# Patient Record
Sex: MALE | Race: WHITE | NOT HISPANIC OR LATINO | Employment: UNEMPLOYED | URBAN - METROPOLITAN AREA
[De-identification: names, ages, dates, MRNs, and addresses within clinical notes are randomized per-mention and may not be internally consistent; named-entity substitution may affect disease eponyms.]

---

## 2023-09-06 ENCOUNTER — HOSPITAL ENCOUNTER (OUTPATIENT)
Dept: DATA CONVERSION | Facility: HOSPITAL | Age: 53
End: 2023-09-06
Attending: INTERNAL MEDICINE | Admitting: INTERNAL MEDICINE
Payer: COMMERCIAL

## 2023-09-06 DIAGNOSIS — E11.9 TYPE 2 DIABETES MELLITUS WITHOUT COMPLICATIONS (MULTI): ICD-10-CM

## 2023-09-06 DIAGNOSIS — I10 ESSENTIAL (PRIMARY) HYPERTENSION: ICD-10-CM

## 2023-09-06 DIAGNOSIS — I20.9 ANGINA PECTORIS, UNSPECIFIED (CMS-HCC): ICD-10-CM

## 2023-09-06 DIAGNOSIS — I25.119 ATHEROSCLEROTIC HEART DISEASE OF NATIVE CORONARY ARTERY WITH UNSPECIFIED ANGINA PECTORIS (CMS-HCC): ICD-10-CM

## 2023-09-06 DIAGNOSIS — R06.02 SHORTNESS OF BREATH: ICD-10-CM

## 2023-09-06 DIAGNOSIS — E78.5 HYPERLIPIDEMIA, UNSPECIFIED: ICD-10-CM

## 2023-09-29 VITALS — HEIGHT: 69 IN | BODY MASS INDEX: 36.8 KG/M2 | WEIGHT: 248.46 LBS

## 2023-09-30 NOTE — H&P
History & Physical Reviewed:   I have reviewed the History and Physical dated:  10-Aug-2023   History and Physical reviewed and relevant findings noted. Patient examined to review pertinent physical  findings.: No significant changes   Home Medications Reviewed: no changes noted   Allergies Reviewed: no changes noted       Airway/Sedation Assessment:  ·  Emotional Status calm   ·  Neurologic alert & oriented x 3   ·  Respiratory clear to auscultation   ·  Cardiovascular rhythm & rate regular   ·  GI/ soft, nontender     · Pulses present: Pedal Left, Pedal Right, Radial Left, Radial Right     ·  Mouth Opening OK yes   ·  Neck Flexibility OK yes   ·  Loose Teeth no     Oropharyngeal Classification:          ·  Oropharyngeal Classification Class I   ·  ASA PS Classification ASA II   ·  Sedation Plan moderate sedation       ERAS (Enhanced Recovery After Surgery):  ·  ERAS Patient: no     Consent:   COVID-19 Consent:  ·  COVID-19 Risk Consent Surgeon has reviewed key risks related to the risk of herminio COVID-19 and if they contract COVID-19 what the risks are.       Electronic Signatures:  Harpreet Rhodes)  (Signed 06-Sep-2023 07:47)   Authored: History & Physical Reviewed, Airway/Sedation,  ERAS, Consent, Note Completion      Last Updated: 06-Sep-2023 07:47 by Harpreet Rhodes)

## 2023-12-21 ENCOUNTER — HOSPITAL ENCOUNTER (EMERGENCY)
Facility: HOSPITAL | Age: 53
Discharge: HOME | End: 2023-12-21
Attending: EMERGENCY MEDICINE
Payer: MEDICARE

## 2023-12-21 ENCOUNTER — APPOINTMENT (OUTPATIENT)
Dept: RADIOLOGY | Facility: HOSPITAL | Age: 53
End: 2023-12-21
Payer: MEDICARE

## 2023-12-21 VITALS
OXYGEN SATURATION: 96 % | SYSTOLIC BLOOD PRESSURE: 150 MMHG | RESPIRATION RATE: 18 BRPM | DIASTOLIC BLOOD PRESSURE: 92 MMHG | HEIGHT: 69 IN | WEIGHT: 248 LBS | HEART RATE: 89 BPM | BODY MASS INDEX: 36.73 KG/M2 | TEMPERATURE: 98.2 F

## 2023-12-21 DIAGNOSIS — G43.819 OTHER MIGRAINE WITHOUT STATUS MIGRAINOSUS, INTRACTABLE: Primary | ICD-10-CM

## 2023-12-21 LAB
ALBUMIN SERPL BCP-MCNC: 5 G/DL (ref 3.4–5)
ALP SERPL-CCNC: 62 U/L (ref 33–120)
ALT SERPL W P-5'-P-CCNC: 19 U/L (ref 10–52)
ANION GAP SERPL CALC-SCNC: 12 MMOL/L (ref 10–20)
AST SERPL W P-5'-P-CCNC: 15 U/L (ref 9–39)
BASOPHILS # BLD AUTO: 0.04 X10*3/UL (ref 0–0.1)
BASOPHILS NFR BLD AUTO: 0.5 %
BILIRUB SERPL-MCNC: 0.4 MG/DL (ref 0–1.2)
BUN SERPL-MCNC: 14 MG/DL (ref 6–23)
CALCIUM SERPL-MCNC: 10.4 MG/DL (ref 8.6–10.3)
CHLORIDE SERPL-SCNC: 102 MMOL/L (ref 98–107)
CO2 SERPL-SCNC: 28 MMOL/L (ref 21–32)
CREAT SERPL-MCNC: 0.92 MG/DL (ref 0.5–1.3)
CRP SERPL-MCNC: 0.15 MG/DL
EOSINOPHIL # BLD AUTO: 0.1 X10*3/UL (ref 0–0.7)
EOSINOPHIL NFR BLD AUTO: 1.3 %
ERYTHROCYTE [DISTWIDTH] IN BLOOD BY AUTOMATED COUNT: 13.1 % (ref 11.5–14.5)
ERYTHROCYTE [SEDIMENTATION RATE] IN BLOOD BY WESTERGREN METHOD: 8 MM/H (ref 0–20)
GFR SERPL CREATININE-BSD FRML MDRD: >90 ML/MIN/1.73M*2
GLUCOSE SERPL-MCNC: 184 MG/DL (ref 74–99)
HCT VFR BLD AUTO: 50.3 % (ref 41–52)
HGB BLD-MCNC: 17.1 G/DL (ref 13.5–17.5)
HOLD SPECIMEN: NORMAL
IMM GRANULOCYTES # BLD AUTO: 0.02 X10*3/UL (ref 0–0.7)
IMM GRANULOCYTES NFR BLD AUTO: 0.3 % (ref 0–0.9)
LYMPHOCYTES # BLD AUTO: 2.25 X10*3/UL (ref 1.2–4.8)
LYMPHOCYTES NFR BLD AUTO: 29.3 %
MCH RBC QN AUTO: 29.5 PG (ref 26–34)
MCHC RBC AUTO-ENTMCNC: 34 G/DL (ref 32–36)
MCV RBC AUTO: 87 FL (ref 80–100)
MONOCYTES # BLD AUTO: 0.35 X10*3/UL (ref 0.1–1)
MONOCYTES NFR BLD AUTO: 4.6 %
NEUTROPHILS # BLD AUTO: 4.92 X10*3/UL (ref 1.2–7.7)
NEUTROPHILS NFR BLD AUTO: 64 %
NRBC BLD-RTO: 0 /100 WBCS (ref 0–0)
PLATELET # BLD AUTO: 286 X10*3/UL (ref 150–450)
POTASSIUM SERPL-SCNC: 4.4 MMOL/L (ref 3.5–5.3)
PROT SERPL-MCNC: 7.3 G/DL (ref 6.4–8.2)
RBC # BLD AUTO: 5.8 X10*6/UL (ref 4.5–5.9)
SODIUM SERPL-SCNC: 138 MMOL/L (ref 136–145)
WBC # BLD AUTO: 7.7 X10*3/UL (ref 4.4–11.3)

## 2023-12-21 PROCEDURE — 85025 COMPLETE CBC W/AUTO DIFF WBC: CPT | Performed by: EMERGENCY MEDICINE

## 2023-12-21 PROCEDURE — 96374 THER/PROPH/DIAG INJ IV PUSH: CPT

## 2023-12-21 PROCEDURE — 86140 C-REACTIVE PROTEIN: CPT | Performed by: EMERGENCY MEDICINE

## 2023-12-21 PROCEDURE — 85652 RBC SED RATE AUTOMATED: CPT | Performed by: EMERGENCY MEDICINE

## 2023-12-21 PROCEDURE — 80053 COMPREHEN METABOLIC PANEL: CPT | Performed by: EMERGENCY MEDICINE

## 2023-12-21 PROCEDURE — 70450 CT HEAD/BRAIN W/O DYE: CPT

## 2023-12-21 PROCEDURE — 2500000004 HC RX 250 GENERAL PHARMACY W/ HCPCS (ALT 636 FOR OP/ED)

## 2023-12-21 PROCEDURE — 36415 COLL VENOUS BLD VENIPUNCTURE: CPT | Performed by: EMERGENCY MEDICINE

## 2023-12-21 PROCEDURE — 96372 THER/PROPH/DIAG INJ SC/IM: CPT | Mod: 59

## 2023-12-21 PROCEDURE — 70450 CT HEAD/BRAIN W/O DYE: CPT | Performed by: RADIOLOGY

## 2023-12-21 PROCEDURE — 99284 EMERGENCY DEPT VISIT MOD MDM: CPT | Mod: 25 | Performed by: EMERGENCY MEDICINE

## 2023-12-21 RX ORDER — SERTRALINE HYDROCHLORIDE 150 MG/1
CAPSULE ORAL
COMMUNITY

## 2023-12-21 RX ORDER — METOPROLOL SUCCINATE 50 MG/1
1 TABLET, EXTENDED RELEASE ORAL EVERY 24 HOURS
COMMUNITY

## 2023-12-21 RX ORDER — FAMOTIDINE 20 MG/1
20 TABLET, FILM COATED ORAL 2 TIMES DAILY
COMMUNITY
Start: 2014-05-02

## 2023-12-21 RX ORDER — ROSUVASTATIN CALCIUM 40 MG/1
20 TABLET, COATED ORAL
COMMUNITY
Start: 2023-11-15

## 2023-12-21 RX ORDER — FENOFIBRATE 145 MG/1
145 TABLET, FILM COATED ORAL
COMMUNITY
Start: 2023-07-20

## 2023-12-21 RX ORDER — DIPHENHYDRAMINE HYDROCHLORIDE 50 MG/ML
25 INJECTION INTRAMUSCULAR; INTRAVENOUS ONCE
Status: COMPLETED | OUTPATIENT
Start: 2023-12-21 | End: 2023-12-21

## 2023-12-21 RX ORDER — METFORMIN HYDROCHLORIDE 500 MG/1
500 TABLET ORAL
COMMUNITY

## 2023-12-21 RX ORDER — KETOROLAC TROMETHAMINE 30 MG/ML
60 INJECTION, SOLUTION INTRAMUSCULAR; INTRAVENOUS ONCE
Status: DISCONTINUED | OUTPATIENT
Start: 2023-12-21 | End: 2023-12-21 | Stop reason: HOSPADM

## 2023-12-21 RX ORDER — INSULIN GLARGINE 100 [IU]/ML
INJECTION, SOLUTION SUBCUTANEOUS
COMMUNITY

## 2023-12-21 RX ORDER — LOSARTAN POTASSIUM 25 MG/1
25 TABLET ORAL
COMMUNITY

## 2023-12-21 RX ORDER — DIPHENHYDRAMINE HYDROCHLORIDE 50 MG/ML
INJECTION INTRAMUSCULAR; INTRAVENOUS
Status: COMPLETED
Start: 2023-12-21 | End: 2023-12-21

## 2023-12-21 RX ORDER — ASPIRIN 81 MG/1
81 TABLET ORAL DAILY
COMMUNITY
Start: 2023-07-20

## 2023-12-21 RX ORDER — FLUOXETINE HYDROCHLORIDE 20 MG/1
60 CAPSULE ORAL
COMMUNITY

## 2023-12-21 RX ORDER — KETOROLAC TROMETHAMINE 30 MG/ML
INJECTION, SOLUTION INTRAMUSCULAR; INTRAVENOUS
Status: COMPLETED
Start: 2023-12-21 | End: 2023-12-21

## 2023-12-21 RX ADMIN — DIPHENHYDRAMINE HYDROCHLORIDE 25 MG: 50 INJECTION INTRAMUSCULAR; INTRAVENOUS at 10:43

## 2023-12-21 RX ADMIN — KETOROLAC TROMETHAMINE 60 MG: 30 INJECTION, SOLUTION INTRAMUSCULAR; INTRAVENOUS at 10:44

## 2023-12-21 ASSESSMENT — PAIN SCALES - GENERAL: PAINLEVEL_OUTOF10: 6

## 2023-12-21 ASSESSMENT — PAIN DESCRIPTION - FREQUENCY: FREQUENCY: CONSTANT/CONTINUOUS

## 2023-12-21 ASSESSMENT — PAIN DESCRIPTION - DESCRIPTORS: DESCRIPTORS: ACHING

## 2023-12-21 ASSESSMENT — PAIN - FUNCTIONAL ASSESSMENT: PAIN_FUNCTIONAL_ASSESSMENT: 0-10

## 2023-12-21 ASSESSMENT — COLUMBIA-SUICIDE SEVERITY RATING SCALE - C-SSRS
6. HAVE YOU EVER DONE ANYTHING, STARTED TO DO ANYTHING, OR PREPARED TO DO ANYTHING TO END YOUR LIFE?: NO
2. HAVE YOU ACTUALLY HAD ANY THOUGHTS OF KILLING YOURSELF?: NO
1. IN THE PAST MONTH, HAVE YOU WISHED YOU WERE DEAD OR WISHED YOU COULD GO TO SLEEP AND NOT WAKE UP?: NO

## 2023-12-21 ASSESSMENT — PAIN DESCRIPTION - PAIN TYPE: TYPE: ACUTE PAIN

## 2023-12-21 NOTE — ED PROVIDER NOTES
HPI   Chief Complaint   Patient presents with    Diplopia     Double vision and pain left eye to temple and down left side of neck since Monday. Was seen at  in [place] and sent here       Patient presents from a local urgent care with a chief complaint of right eye pain and vision changes for approximately the past 4 days.  He was evaluated at urgent care and sent into the emergency department for evaluation of temporal arteritis.  The patient states that he does have a history of migraines and this is similar to one of his previous migraines in the past.  No nausea or vomiting.  Double vision at times however.    ROS:    CONSTITUTIONAL: Denies weight loss, fever and chills    HEENT: Left eye pain with some vision changes    RESPIRATORY: Denies SOB and cough    CV: Denies palpitations or chest pain    GI: Denies abdominal pain, nausea, vomiting and diarrhea    : Denies dysuria and urinary frequency    MUSCULOSKELETAL: Denies myalgia and joint pain    SKIN: Denies rash and pruritus    NEUROLOGICAL: Denies headache and syncope    PSYCHIATRIC: Denies recent changes in mood. Denies anxiety and depression      PHYSICAL EXAM:    GENERAL: Alert and oriented x 3. No acute distress. Well-nourished    EYES: EOMI. Anicteric    HEENT: Moist mucous membranes. No scleral icterus. No cervical lymphadenopathy    LUNGS: Clear to auscultation bilaterally. No accessory muscle use    CARDIOVASCULAR: Regular rate and rhythm. No murmur. No JVD    ABDOMEN: Soft, non-tender and non-distended. No palpable masses    EXTREMITIES: No edema. Non-tender    SKIN: No rashes or lesions    NEUROLOGIC: No focal neurological deficits. CN II-XII grossly intact    PSYCHIATRIC: Cooperative. Appropriate mood and affect      Medical Decision Making:    Differential Diagnosis: Temporal arteritis, migraine, hypertension    Clinical Laboratory Review: Reviewed all labs within normal limits, no signs of temporal arteritis    Imaging Review: CT head  negative    Discussion with Consulting Physician:    Treatment Plan: Discussed with patient this most likely is a migraine.  His blood pressure is elevated which is not helping with his headache and eye pain.  Patient will be treated with an injection of Toradol and Benadryl at this time.  [facility] but he states that they canceled it.  He has been educated to call [facility] and see if he can get in sooner due to his eye pain or migraine.  Patient verbalizes understanding    Follow Up:  Follow up with your primary care physician as soon as possible    Return Precautions:  Return to the emergency department if symptoms worsen at any time    Diagnoses as of 12/21/23 1046  Other migraine without status migrainosus, intractable                                 [place] Coma Scale Score: 15                  Patient History   Past Medical History:   Diagnosis Date    Other specified symptoms and signs involving the circulatory and respiratory systems 10/31/2018    Globus sensation     Past Surgical History:   Procedure Laterality Date    GALLBLADDER SURGERY  10/12/2018    Gallbladder Surgery     No family history on file.  Social History     Tobacco Use    Smoking status: Never    Smokeless tobacco: Never   Substance Use Topics    Alcohol use: Not on file    Drug use: Not on file       Physical Exam   ED Triage Vitals [12/21/23 0949]   Temp Heart Rate Resp BP   36.8 °C (98.2 °F) 95 18 (!) 177/107      SpO2 Temp src Heart Rate Source Patient Position   97 % -- -- --      BP Location FiO2 (%)     -- --       Physical Exam    ED Course & MDM   Diagnoses as of 12/21/23 1046   Other migraine without status migrainosus, intractable       Medical Decision Making      Procedure  Procedures     Ana Soria, DO  12/21/23 1043       Ana Soria, DO  12/21/23 1046

## 2024-02-19 ENCOUNTER — HOSPITAL ENCOUNTER (EMERGENCY)
Facility: HOSPITAL | Age: 54
Discharge: HOME | End: 2024-02-19
Attending: STUDENT IN AN ORGANIZED HEALTH CARE EDUCATION/TRAINING PROGRAM
Payer: MEDICARE

## 2024-02-19 ENCOUNTER — APPOINTMENT (OUTPATIENT)
Dept: RADIOLOGY | Facility: HOSPITAL | Age: 54
End: 2024-02-19
Payer: MEDICARE

## 2024-02-19 VITALS
DIASTOLIC BLOOD PRESSURE: 89 MMHG | OXYGEN SATURATION: 97 % | WEIGHT: 260.36 LBS | HEIGHT: 69 IN | RESPIRATION RATE: 16 BRPM | SYSTOLIC BLOOD PRESSURE: 162 MMHG | HEART RATE: 95 BPM | TEMPERATURE: 98.4 F | BODY MASS INDEX: 38.56 KG/M2
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
## 2024-02-19 DIAGNOSIS — S20.219A CONTUSION OF CHEST WALL, UNSPECIFIED LATERALITY, INITIAL ENCOUNTER: Primary | ICD-10-CM

## 2024-02-19 PROCEDURE — 71046 X-RAY EXAM CHEST 2 VIEWS: CPT

## 2024-02-19 PROCEDURE — 2500000004 HC RX 250 GENERAL PHARMACY W/ HCPCS (ALT 636 FOR OP/ED): Performed by: STUDENT IN AN ORGANIZED HEALTH CARE EDUCATION/TRAINING PROGRAM

## 2024-02-19 PROCEDURE — 99283 EMERGENCY DEPT VISIT LOW MDM: CPT

## 2024-02-19 PROCEDURE — 71046 X-RAY EXAM CHEST 2 VIEWS: CPT | Mod: FOREIGN READ | Performed by: RADIOLOGY

## 2024-02-19 PROCEDURE — 96372 THER/PROPH/DIAG INJ SC/IM: CPT

## 2024-02-19 RX ORDER — KETOROLAC TROMETHAMINE 15 MG/ML
15 INJECTION, SOLUTION INTRAMUSCULAR; INTRAVENOUS ONCE
Status: COMPLETED | OUTPATIENT
Start: 2024-02-19 | End: 2024-02-19

## 2024-02-19 RX ADMIN — KETOROLAC TROMETHAMINE 15 MG: 15 INJECTION, SOLUTION INTRAMUSCULAR; INTRAVENOUS at 09:02

## 2024-02-19 ASSESSMENT — PAIN - FUNCTIONAL ASSESSMENT
PAIN_FUNCTIONAL_ASSESSMENT: 0-10
PAIN_FUNCTIONAL_ASSESSMENT: 0-10

## 2024-02-19 ASSESSMENT — PAIN DESCRIPTION - LOCATION
LOCATION: OTHER (COMMENT)
LOCATION: STERNUM

## 2024-02-19 ASSESSMENT — PAIN SCALES - GENERAL
PAINLEVEL_OUTOF10: 9
PAINLEVEL_OUTOF10: 8

## 2024-02-19 ASSESSMENT — PAIN DESCRIPTION - PAIN TYPE
TYPE: ACUTE PAIN
TYPE: ACUTE PAIN

## 2024-02-19 ASSESSMENT — COLUMBIA-SUICIDE SEVERITY RATING SCALE - C-SSRS
1. IN THE PAST MONTH, HAVE YOU WISHED YOU WERE DEAD OR WISHED YOU COULD GO TO SLEEP AND NOT WAKE UP?: NO
6. HAVE YOU EVER DONE ANYTHING, STARTED TO DO ANYTHING, OR PREPARED TO DO ANYTHING TO END YOUR LIFE?: NO
2. HAVE YOU ACTUALLY HAD ANY THOUGHTS OF KILLING YOURSELF?: NO

## 2024-02-19 ASSESSMENT — PAIN DESCRIPTION - FREQUENCY: FREQUENCY: CONSTANT/CONTINUOUS

## 2024-02-19 ASSESSMENT — PAIN DESCRIPTION - DESCRIPTORS: DESCRIPTORS: ACHING

## 2024-02-19 ASSESSMENT — PAIN DESCRIPTION - PROGRESSION: CLINICAL_PROGRESSION: NOT CHANGED

## 2024-02-19 NOTE — ED PROVIDER NOTES
Chief Complaint: Fall  HPI: This is a 54-year-old male, presenting to the emergency department for evaluation of anterior chest wall pain which began around 1 AM after he slipped down the last 3 stairs and fell forward onto his chest.  Patient states that he caught the edge of the step on his anterior chest, and is having pain.  He states that he did hit his head however denies any loss consciousness.  He denies any dizziness, lightheadedness, nausea, vomiting, change in vision.  He is not on blood thinners.  He denies any neck or back pain.    Past Medical History:   Diagnosis Date    Other specified symptoms and signs involving the circulatory and respiratory systems 10/31/2018    Globus sensation      Past Surgical History:   Procedure Laterality Date    GALLBLADDER SURGERY  10/12/2018    Gallbladder Surgery       Physical Exam  Constitutional:       Appearance: Normal appearance.   HENT:      Head: Normocephalic and atraumatic.      Mouth/Throat:      Mouth: Mucous membranes are moist.   Eyes:      Extraocular Movements: Extraocular movements intact.   Cardiovascular:      Rate and Rhythm: Normal rate and regular rhythm.   Pulmonary:      Effort: Pulmonary effort is normal.   Chest:      Chest wall: Tenderness (Chest wall tenderness to the anterior and right side of the chest wall with a very small abrasion to the anterior chest) present.   Abdominal:      General: Abdomen is flat.      Palpations: Abdomen is soft.   Skin:     General: Skin is warm.   Neurological:      General: No focal deficit present.      Mental Status: He is alert.   Psychiatric:         Mood and Affect: Mood normal.            ED Course/[facility]  Diagnoses as of 02/19/24 0937   Contusion of chest wall, unspecified laterality, initial encounter   Fall, initial encounter       This is a 54 y.o. male presenting to the ED for evaluation of anterior chest wall pain which began around 1:00 this morning when he slipped on the last few stairs and fell,  striking the front of his chest.  Patient states that he did hit his head however denies any loss of consciousness, and is not on any blood thinners.  He did not take any medication for the pain this morning.  On physical exam, patient is resting comfortably in the bed, no acute distress.  Heart is regular rate and rhythm, lungs are clear to auscultation bilaterally.  There is a very small area of abrasion to the anterior chest with some tenderness to palpation in the area.  No crepitus, deformity, step-offs.  No other external signs of trauma.  No C/T/L-spine tenderness.  Patient was given Toradol with relief in his pain.  Chest x-ray was nonconcerning for any evidence of rib fractures or lung pathology.  I do feel that the patient's symptoms are likely secondary to a rib contusion.  He was given incentive spirometer and advised to use it at home.  He was advised to return to the emergency department for any worsening symptoms and was ultimately discharged home in stable condition.    Final Impression  1. Chest wall contusion   Disposition/Plan: discharge home   Condition at disposition: Stable.     Kirstin Christianson DO  Emergency Medicine Physician     Kirstin Christianson DO  02/19/24 1028

## 2025-03-28 ENCOUNTER — OFFICE VISIT (OUTPATIENT)
Dept: URGENT CARE | Facility: URGENT CARE | Age: 55
End: 2025-03-28
Payer: COMMERCIAL

## 2025-03-28 VITALS
BODY MASS INDEX: 37.37 KG/M2 | OXYGEN SATURATION: 96 % | WEIGHT: 253.09 LBS | SYSTOLIC BLOOD PRESSURE: 126 MMHG | TEMPERATURE: 98.5 F | RESPIRATION RATE: 16 BRPM | HEART RATE: 110 BPM | DIASTOLIC BLOOD PRESSURE: 82 MMHG

## 2025-03-28 DIAGNOSIS — R11.2 NAUSEA AND VOMITING, UNSPECIFIED VOMITING TYPE: ICD-10-CM

## 2025-03-28 DIAGNOSIS — A08.4 VIRAL GASTROENTERITIS: Primary | ICD-10-CM

## 2025-03-28 DIAGNOSIS — R19.7 DIARRHEA OF PRESUMED INFECTIOUS ORIGIN: ICD-10-CM

## 2025-03-28 PROBLEM — E83.110 HEREDITARY HEMOCHROMATOSIS (CMS-HCC): Status: ACTIVE | Noted: 2025-03-28

## 2025-03-28 PROBLEM — R79.89 ABNORMAL LIVER FUNCTION TESTS: Status: ACTIVE | Noted: 2025-03-28

## 2025-03-28 PROBLEM — H52.4 PRESBYOPIA: Status: ACTIVE | Noted: 2025-03-28

## 2025-03-28 PROBLEM — F32.9 MAJOR DEPRESSIVE DISORDER: Status: ACTIVE | Noted: 2025-03-28

## 2025-03-28 PROBLEM — F43.12 CHRONIC POST-TRAUMATIC STRESS DISORDER (PTSD): Status: ACTIVE | Noted: 2025-03-28

## 2025-03-28 PROBLEM — E11.36 TYPE 2 DIABETES MELLITUS WITH DIABETIC CATARACT: Status: ACTIVE | Noted: 2025-03-28

## 2025-03-28 PROBLEM — E34.9 HYPOTESTOSTERONEMIA: Status: ACTIVE | Noted: 2025-03-28

## 2025-03-28 PROBLEM — E11.9 TYPE 2 DIABETES MELLITUS: Status: ACTIVE | Noted: 2025-03-28

## 2025-03-28 PROBLEM — E11.42 DIABETIC PERIPHERAL NEUROPATHY ASSOCIATED WITH TYPE 2 DIABETES MELLITUS: Status: ACTIVE | Noted: 2025-03-28

## 2025-03-28 PROBLEM — I25.10 ATHEROSCLEROSIS OF CORONARY ARTERY: Status: ACTIVE | Noted: 2025-03-28

## 2025-03-28 PROBLEM — G43.909 MIGRAINE HEADACHE: Status: ACTIVE | Noted: 2025-03-28

## 2025-03-28 PROBLEM — R79.89 HIGH SERUM FERRITIN: Status: ACTIVE | Noted: 2025-03-28

## 2025-03-28 PROBLEM — E78.5 HYPERLIPIDEMIA: Status: ACTIVE | Noted: 2025-03-28

## 2025-03-28 PROBLEM — E55.9 VITAMIN D DEFICIENCY: Status: ACTIVE | Noted: 2025-03-28

## 2025-03-28 PROBLEM — I10 BENIGN ESSENTIAL HYPERTENSION: Status: ACTIVE | Noted: 2025-03-28

## 2025-03-28 PROBLEM — K57.32 DIVERTICULITIS OF COLON: Status: ACTIVE | Noted: 2025-03-28

## 2025-03-28 PROBLEM — G47.33 OBSTRUCTIVE SLEEP APNEA OF ADULT: Status: ACTIVE | Noted: 2025-03-28

## 2025-03-28 PROBLEM — I10 HYPERTENSION: Status: ACTIVE | Noted: 2025-03-28

## 2025-03-28 PROBLEM — E66.9 OBESITY: Status: ACTIVE | Noted: 2025-03-28

## 2025-03-28 PROBLEM — Q67.0 FACIAL ASYMMETRY: Status: ACTIVE | Noted: 2025-03-28

## 2025-03-28 PROBLEM — E11.40 DIABETIC NEUROPATHY (MULTI): Status: ACTIVE | Noted: 2025-03-28

## 2025-03-28 PROBLEM — K21.9 GERD (GASTROESOPHAGEAL REFLUX DISEASE): Status: ACTIVE | Noted: 2025-03-28

## 2025-03-28 PROBLEM — E83.42 HYPOMAGNESEMIA: Status: ACTIVE | Noted: 2025-03-28

## 2025-03-28 RX ORDER — ONDANSETRON 4 MG/1
4 TABLET, ORALLY DISINTEGRATING ORAL ONCE
Status: COMPLETED | OUTPATIENT
Start: 2025-03-28 | End: 2025-03-28

## 2025-03-28 RX ORDER — GABAPENTIN 100 MG/1
100 CAPSULE ORAL
COMMUNITY
Start: 2025-03-24

## 2025-03-28 RX ORDER — IBUPROFEN 200 MG
4 TABLET ORAL AS NEEDED
COMMUNITY

## 2025-03-28 RX ORDER — TADALAFIL 10 MG/1
10 TABLET ORAL DAILY PRN
COMMUNITY
Start: 2024-08-08

## 2025-03-28 RX ORDER — ONDANSETRON 4 MG/1
4 TABLET, ORALLY DISINTEGRATING ORAL EVERY 8 HOURS PRN
Qty: 20 TABLET | Refills: 0 | Status: SHIPPED | OUTPATIENT
Start: 2025-03-28 | End: 2025-04-05

## 2025-03-28 RX ADMIN — ONDANSETRON 4 MG: 4 TABLET, ORALLY DISINTEGRATING ORAL at 18:36

## 2025-03-28 ASSESSMENT — ENCOUNTER SYMPTOMS
DYSURIA: 0
SORE THROAT: 0
DIARRHEA: 1
SHORTNESS OF BREATH: 0
SINUS PRESSURE: 0
NAUSEA: 1
RHINORRHEA: 0
VOMITING: 1
FREQUENCY: 0
WHEEZING: 0
CONSTIPATION: 0
PALPITATIONS: 0
HEADACHES: 1
COUGH: 0
CHEST TIGHTNESS: 0
CHILLS: 0
ABDOMINAL PAIN: 1
FEVER: 0

## 2025-03-28 NOTE — PROGRESS NOTES
Subjective   Patient ID: Pato Venegas is a 55 y.o. male.    HPI: 55-year-old male presents with wife with complaint of nausea and vomiting since 3 AM yesterday with diarrhea beginning at 4 AM today.  He also reports a headache.      History provided by:  Patient   used: No    Headache  Associated symptoms: abdominal pain, diarrhea, nausea and vomiting    Associated symptoms: no congestion, no cough, no ear pain, no fever, no sinus pressure and no sore throat    Diarrhea  Associated symptoms: abdominal pain, headaches and vomiting    Associated symptoms: no chills and no fever    Vomiting  Associated symptoms: abdominal pain, diarrhea and headaches    Associated symptoms: no chills, no cough, no fever and no sore throat        The following portions of the chart were reviewed this encounter and updated as appropriate:  Tobacco  Allergies  Meds  Problems  Med Hx  Surg Hx  Fam Hx         Review of Systems   Constitutional:  Negative for chills and fever.   HENT:  Negative for congestion, ear pain, rhinorrhea, sinus pressure and sore throat.    Respiratory:  Negative for cough, chest tightness, shortness of breath and wheezing.    Cardiovascular:  Negative for palpitations.   Gastrointestinal:  Positive for abdominal pain, diarrhea, nausea and vomiting. Negative for constipation.   Genitourinary:  Negative for dysuria and frequency.   Neurological:  Positive for headaches.     Objective   Physical Exam  Vital signs are reviewed.  Tachycardic at 110 bpm.    Alert and oriented x3 with normal mood and affect  Patient is well nourished, well-developed, alert and in no acute distress  No pain to palpation over frontal, ethmoid or maxillary sinus areas    External eyes, orbits, conjunctiva and eyelids are normal in appearance  Pupils are equal, round, reactive to light and accommodation, extraocular movements intact    External ears appear normal  External canals are normal in appearance  Right  tympanic membrane is intact and pale gray in appearance  Left tympanic membrane is intact and pale gray in appearance  There is no middle ear effusion noted on the right  There is no middle ear effusion noted on the left  External appearance of the nose is normal  Nasal mucosa, septum, turbinates are mildly reddened and moderately swollen in appearance  There is scant thin yellow nasal discharge in both nares    Oral mucosa is uniformly pink and moist  Palate is pink, symmetric and intact  Tongue is moist, mobile and midline  Posterior pharynx mildly erythematous with no concretions or exudates present  No cervical lymphadenopathy palpated    Heart has regular rate and rhythm. No murmurs, rubs or gallops are auscultated at this exam.    Respiratory rate rhythm and effort are normal. Breath sounds bilaterally are clear on auscultation without crackles, rhonchi, wheezes or friction rub.    Abdomen: Normal bowel sounds on auscultation. Soft, generalized nontenderness without rebound or rigidity on palpation  Procedures    Assessment/Plan   Diagnoses and all orders for this visit:  Viral gastroenteritis  -     ondansetron ODT (Zofran-ODT) disintegrating tablet 4 mg  -     ondansetron ODT (Zofran-ODT) 4 mg disintegrating tablet; Dissolve 1 tablet (4 mg) in the mouth every 8 hours if needed for nausea or vomiting for up to 7 days.  Nausea and vomiting, unspecified vomiting type  -     ondansetron ODT (Zofran-ODT) disintegrating tablet 4 mg  -     ondansetron ODT (Zofran-ODT) 4 mg disintegrating tablet; Dissolve 1 tablet (4 mg) in the mouth every 8 hours if needed for nausea or vomiting for up to 7 days.  Diarrhea of presumed infectious origin    Patient disposition: Home

## 2025-03-28 NOTE — PATIENT INSTRUCTIONS
You have gastroenteritis. This is most likely of viral illness. Most commonly this is associated with vomiting and diarrhea. Because this is a viral illness, antibiotics are not beneficial. Unfortunately the illness has to run its course which is typically 3-5 days.  Increase your oral fluids for the next 7-10 days  You have been given a prescription for Zofran, a medicine to help control vomiting. You may use this as directed if needed to control your nausea and vomiting for the next 24-48 hours.  After episode of vomiting, wait at least 2 hours and take nothing by mouth. Then begin with small amounts of clear room temperature liquids, initially try to sip 2-4 ounces per hour. You may advance and increase the liquids as tolerated. Continue taking fluids for 24 hours. If no further vomiting in 24 hours, you may advance to a bland, dry, low residue with solid foods such as toast and crackers.  Continue advancing as tolerated to regular diet. Avoid fruit juices, fresh fruits, fresh vegetables especially leafy green vegetables and high-fiber foods until bowel function has returned to normal. Please reserve dairy products, fatty foods, fried foods until you are feeling better and vomiting and diarrhea have subsided  If no better in 5-7 days please follow-up with your primary care provider  If fever greater than 102°F, chills, increased vomiting, vomiting blood, vomiting for more than 24-48 hours, increased abdominal pain, difficulty swallowing, worsening diarrhea or bloody diarrhea go to emergency department for immediate evaluation and treatment  You have gastroenteritis. This is usually a viral illness. Often this starts with nausea and vomiting and progresses on to diarrhea.  Take small amounts of clear room temperature liquids, initially try to sip 2-4 ounces per hour. Continue taking extra fluids until diarrhea is resolved. Avoid fruit juices, fresh fruits and vegetables especially leafy high fiber vegetables.  Avoid high fiber foods until feeling better. Advance to a bland, dry low residue diet with solid foods such as toast and crackers.  Continue advancing as tolerated to regular diet. Please reserve dairy products, fatty foods, fried foods until you are feeling better.  Because this is an infection, it is not recommended to use antidiarrheal agents excessively.  You may use Imodium sparingly for this problem. You may take one dose after each of your first 2 loose stools of the day. Do not exceed 4 tablets in 24 hours  If no improvement in 24-48 hours please follow-up with your primary care physician or return to urgent care.  If fever greater than 102° F, chills, increased vomiting, vomiting blood or material resembling coffee grounds, increased abdominal pain, bloody diarrhea or more than 10 bowel movements in 24 hours go to emergency department for immediate evaluation and treatment  This note was generated by voice recognition software. Minor transcription/grammatical errors may be present. Please call for clarification.